# Patient Record
Sex: FEMALE | Race: WHITE | NOT HISPANIC OR LATINO | URBAN - METROPOLITAN AREA
[De-identification: names, ages, dates, MRNs, and addresses within clinical notes are randomized per-mention and may not be internally consistent; named-entity substitution may affect disease eponyms.]

---

## 2018-03-24 ENCOUNTER — INPATIENT (INPATIENT)
Facility: HOSPITAL | Age: 33
LOS: 2 days | Discharge: HOME | End: 2018-03-27
Attending: OBSTETRICS & GYNECOLOGY | Admitting: OBSTETRICS & GYNECOLOGY
Payer: COMMERCIAL

## 2018-03-24 VITALS — HEIGHT: 65 IN | WEIGHT: 154.32 LBS

## 2018-03-24 DIAGNOSIS — Z34.80 ENCOUNTER FOR SUPERVISION OF OTHER NORMAL PREGNANCY, UNSPECIFIED TRIMESTER: ICD-10-CM

## 2018-03-24 DIAGNOSIS — O26.899 OTHER SPECIFIED PREGNANCY RELATED CONDITIONS, UNSPECIFIED TRIMESTER: ICD-10-CM

## 2018-03-24 DIAGNOSIS — Z3A.00 WEEKS OF GESTATION OF PREGNANCY NOT SPECIFIED: ICD-10-CM

## 2018-03-24 LAB
BASOPHILS # BLD AUTO: 0 K/UL — SIGNIFICANT CHANGE UP (ref 0–0.2)
BASOPHILS NFR BLD AUTO: 0.5 % — SIGNIFICANT CHANGE UP (ref 0–2)
BLD GP AB SCN SERPL QL: NEGATIVE — SIGNIFICANT CHANGE UP
EOSINOPHIL # BLD AUTO: 0 K/UL — SIGNIFICANT CHANGE UP (ref 0–0.5)
EOSINOPHIL NFR BLD AUTO: 0.4 % — SIGNIFICANT CHANGE UP (ref 0–6)
HCT VFR BLD CALC: 35.4 % — SIGNIFICANT CHANGE UP (ref 34.5–45)
HGB BLD-MCNC: 12.1 G/DL — SIGNIFICANT CHANGE UP (ref 11.5–15.5)
LYMPHOCYTES # BLD AUTO: 1.8 K/UL — SIGNIFICANT CHANGE UP (ref 1–3.3)
LYMPHOCYTES # BLD AUTO: 19.4 % — SIGNIFICANT CHANGE UP (ref 13–44)
MCHC RBC-ENTMCNC: 27.5 PG — SIGNIFICANT CHANGE UP (ref 27–34)
MCHC RBC-ENTMCNC: 34.3 GM/DL — SIGNIFICANT CHANGE UP (ref 32–36)
MCV RBC AUTO: 80.3 FL — SIGNIFICANT CHANGE UP (ref 80–100)
MONOCYTES # BLD AUTO: 0.7 K/UL — SIGNIFICANT CHANGE UP (ref 0–0.9)
MONOCYTES NFR BLD AUTO: 8 % — SIGNIFICANT CHANGE UP (ref 2–14)
NEUTROPHILS # BLD AUTO: 6.6 K/UL — SIGNIFICANT CHANGE UP (ref 1.8–7.4)
NEUTROPHILS NFR BLD AUTO: 71.7 % — SIGNIFICANT CHANGE UP (ref 43–77)
PLATELET # BLD AUTO: 174 K/UL — SIGNIFICANT CHANGE UP (ref 150–400)
RBC # BLD: 4.41 M/UL — SIGNIFICANT CHANGE UP (ref 3.8–5.2)
RBC # FLD: 16.4 % — HIGH (ref 10.3–14.5)
RH IG SCN BLD-IMP: POSITIVE — SIGNIFICANT CHANGE UP
WBC # BLD: 9.2 K/UL — SIGNIFICANT CHANGE UP (ref 3.8–10.5)
WBC # FLD AUTO: 9.2 K/UL — SIGNIFICANT CHANGE UP (ref 3.8–10.5)

## 2018-03-24 RX ORDER — OXYTOCIN 10 UNIT/ML
333.33 VIAL (ML) INJECTION
Qty: 20 | Refills: 0 | Status: DISCONTINUED | OUTPATIENT
Start: 2018-03-24 | End: 2018-03-25

## 2018-03-24 RX ORDER — SODIUM CHLORIDE 9 MG/ML
1000 INJECTION, SOLUTION INTRAVENOUS ONCE
Qty: 0 | Refills: 0 | Status: COMPLETED | OUTPATIENT
Start: 2018-03-24 | End: 2018-03-25

## 2018-03-24 RX ORDER — AMPICILLIN TRIHYDRATE 250 MG
CAPSULE ORAL
Qty: 0 | Refills: 0 | Status: DISCONTINUED | OUTPATIENT
Start: 2018-03-24 | End: 2018-03-25

## 2018-03-24 RX ORDER — AMPICILLIN TRIHYDRATE 250 MG
2 CAPSULE ORAL ONCE
Qty: 0 | Refills: 0 | Status: COMPLETED | OUTPATIENT
Start: 2018-03-24 | End: 2018-03-24

## 2018-03-24 RX ORDER — AMPICILLIN TRIHYDRATE 250 MG
1 CAPSULE ORAL EVERY 4 HOURS
Qty: 0 | Refills: 0 | Status: DISCONTINUED | OUTPATIENT
Start: 2018-03-25 | End: 2018-03-25

## 2018-03-24 RX ORDER — SODIUM CHLORIDE 9 MG/ML
1000 INJECTION, SOLUTION INTRAVENOUS
Qty: 0 | Refills: 0 | Status: DISCONTINUED | OUTPATIENT
Start: 2018-03-24 | End: 2018-03-25

## 2018-03-24 RX ORDER — CITRIC ACID/SODIUM CITRATE 300-500 MG
15 SOLUTION, ORAL ORAL EVERY 4 HOURS
Qty: 0 | Refills: 0 | Status: DISCONTINUED | OUTPATIENT
Start: 2018-03-24 | End: 2018-03-25

## 2018-03-24 RX ADMIN — Medication 216 GRAM(S): at 21:41

## 2018-03-25 LAB
RH IG SCN BLD-IMP: POSITIVE — SIGNIFICANT CHANGE UP
T PALLIDUM AB TITR SER: NEGATIVE — SIGNIFICANT CHANGE UP

## 2018-03-25 RX ORDER — DOCUSATE SODIUM 100 MG
100 CAPSULE ORAL
Qty: 0 | Refills: 0 | Status: DISCONTINUED | OUTPATIENT
Start: 2018-03-25 | End: 2018-03-26

## 2018-03-25 RX ORDER — HYDROCORTISONE 1 %
1 OINTMENT (GRAM) TOPICAL EVERY 4 HOURS
Qty: 0 | Refills: 0 | Status: DISCONTINUED | OUTPATIENT
Start: 2018-03-25 | End: 2018-03-25

## 2018-03-25 RX ORDER — SODIUM CHLORIDE 9 MG/ML
3 INJECTION INTRAMUSCULAR; INTRAVENOUS; SUBCUTANEOUS EVERY 8 HOURS
Qty: 0 | Refills: 0 | Status: DISCONTINUED | OUTPATIENT
Start: 2018-03-25 | End: 2018-03-27

## 2018-03-25 RX ORDER — OXYCODONE HYDROCHLORIDE 5 MG/1
5 TABLET ORAL EVERY 4 HOURS
Qty: 0 | Refills: 0 | Status: DISCONTINUED | OUTPATIENT
Start: 2018-03-25 | End: 2018-03-27

## 2018-03-25 RX ORDER — IBUPROFEN 200 MG
600 TABLET ORAL EVERY 6 HOURS
Qty: 0 | Refills: 0 | Status: COMPLETED | OUTPATIENT
Start: 2018-03-25 | End: 2019-02-21

## 2018-03-25 RX ORDER — DIPHENHYDRAMINE HCL 50 MG
25 CAPSULE ORAL EVERY 6 HOURS
Qty: 0 | Refills: 0 | Status: DISCONTINUED | OUTPATIENT
Start: 2018-03-25 | End: 2018-03-27

## 2018-03-25 RX ORDER — PRAMOXINE HYDROCHLORIDE 150 MG/15G
1 AEROSOL, FOAM RECTAL EVERY 4 HOURS
Qty: 0 | Refills: 0 | Status: DISCONTINUED | OUTPATIENT
Start: 2018-03-25 | End: 2018-03-27

## 2018-03-25 RX ORDER — AER TRAVELER 0.5 G/1
1 SOLUTION RECTAL; TOPICAL EVERY 4 HOURS
Qty: 0 | Refills: 0 | Status: DISCONTINUED | OUTPATIENT
Start: 2018-03-25 | End: 2018-03-27

## 2018-03-25 RX ORDER — IBUPROFEN 200 MG
600 TABLET ORAL EVERY 6 HOURS
Qty: 0 | Refills: 0 | Status: DISCONTINUED | OUTPATIENT
Start: 2018-03-25 | End: 2018-03-27

## 2018-03-25 RX ORDER — SODIUM CHLORIDE 9 MG/ML
3 INJECTION INTRAMUSCULAR; INTRAVENOUS; SUBCUTANEOUS EVERY 8 HOURS
Qty: 0 | Refills: 0 | Status: DISCONTINUED | OUTPATIENT
Start: 2018-03-25 | End: 2018-03-25

## 2018-03-25 RX ORDER — OXYTOCIN 10 UNIT/ML
41.67 VIAL (ML) INJECTION
Qty: 20 | Refills: 0 | Status: DISCONTINUED | OUTPATIENT
Start: 2018-03-25 | End: 2018-03-27

## 2018-03-25 RX ORDER — OXYTOCIN 10 UNIT/ML
4 VIAL (ML) INJECTION
Qty: 30 | Refills: 0 | Status: DISCONTINUED | OUTPATIENT
Start: 2018-03-25 | End: 2018-03-25

## 2018-03-25 RX ORDER — ACETAMINOPHEN 500 MG
975 TABLET ORAL EVERY 6 HOURS
Qty: 0 | Refills: 0 | Status: COMPLETED | OUTPATIENT
Start: 2018-03-25 | End: 2019-02-21

## 2018-03-25 RX ORDER — DIBUCAINE 1 %
1 OINTMENT (GRAM) RECTAL EVERY 4 HOURS
Qty: 0 | Refills: 0 | Status: DISCONTINUED | OUTPATIENT
Start: 2018-03-25 | End: 2018-03-27

## 2018-03-25 RX ORDER — KETOROLAC TROMETHAMINE 30 MG/ML
30 SYRINGE (ML) INJECTION ONCE
Qty: 0 | Refills: 0 | Status: DISCONTINUED | OUTPATIENT
Start: 2018-03-25 | End: 2018-03-25

## 2018-03-25 RX ORDER — SIMETHICONE 80 MG/1
80 TABLET, CHEWABLE ORAL EVERY 6 HOURS
Qty: 0 | Refills: 0 | Status: DISCONTINUED | OUTPATIENT
Start: 2018-03-25 | End: 2018-03-27

## 2018-03-25 RX ORDER — HYDROCORTISONE 1 %
1 OINTMENT (GRAM) TOPICAL EVERY 4 HOURS
Qty: 0 | Refills: 0 | Status: DISCONTINUED | OUTPATIENT
Start: 2018-03-25 | End: 2018-03-27

## 2018-03-25 RX ORDER — GLYCERIN ADULT
1 SUPPOSITORY, RECTAL RECTAL AT BEDTIME
Qty: 0 | Refills: 0 | Status: DISCONTINUED | OUTPATIENT
Start: 2018-03-25 | End: 2018-03-27

## 2018-03-25 RX ORDER — ACETAMINOPHEN 500 MG
975 TABLET ORAL EVERY 6 HOURS
Qty: 0 | Refills: 0 | Status: DISCONTINUED | OUTPATIENT
Start: 2018-03-25 | End: 2018-03-27

## 2018-03-25 RX ORDER — LANOLIN
1 OINTMENT (GRAM) TOPICAL EVERY 6 HOURS
Qty: 0 | Refills: 0 | Status: DISCONTINUED | OUTPATIENT
Start: 2018-03-25 | End: 2018-03-27

## 2018-03-25 RX ORDER — OXYCODONE HYDROCHLORIDE 5 MG/1
5 TABLET ORAL
Qty: 0 | Refills: 0 | Status: DISCONTINUED | OUTPATIENT
Start: 2018-03-25 | End: 2018-03-27

## 2018-03-25 RX ORDER — DIBUCAINE 1 %
1 OINTMENT (GRAM) RECTAL EVERY 4 HOURS
Qty: 0 | Refills: 0 | Status: DISCONTINUED | OUTPATIENT
Start: 2018-03-25 | End: 2018-03-25

## 2018-03-25 RX ORDER — OXYTOCIN 10 UNIT/ML
41.67 VIAL (ML) INJECTION
Qty: 20 | Refills: 0 | Status: DISCONTINUED | OUTPATIENT
Start: 2018-03-25 | End: 2018-03-25

## 2018-03-25 RX ORDER — AER TRAVELER 0.5 G/1
1 SOLUTION RECTAL; TOPICAL EVERY 4 HOURS
Qty: 0 | Refills: 0 | Status: DISCONTINUED | OUTPATIENT
Start: 2018-03-25 | End: 2018-03-25

## 2018-03-25 RX ORDER — PRAMOXINE HYDROCHLORIDE 150 MG/15G
1 AEROSOL, FOAM RECTAL EVERY 4 HOURS
Qty: 0 | Refills: 0 | Status: DISCONTINUED | OUTPATIENT
Start: 2018-03-25 | End: 2018-03-25

## 2018-03-25 RX ORDER — MAGNESIUM HYDROXIDE 400 MG/1
30 TABLET, CHEWABLE ORAL
Qty: 0 | Refills: 0 | Status: DISCONTINUED | OUTPATIENT
Start: 2018-03-25 | End: 2018-03-27

## 2018-03-25 RX ORDER — TETANUS TOXOID, REDUCED DIPHTHERIA TOXOID AND ACELLULAR PERTUSSIS VACCINE, ADSORBED 5; 2.5; 8; 8; 2.5 [IU]/.5ML; [IU]/.5ML; UG/.5ML; UG/.5ML; UG/.5ML
0.5 SUSPENSION INTRAMUSCULAR ONCE
Qty: 0 | Refills: 0 | Status: DISCONTINUED | OUTPATIENT
Start: 2018-03-25 | End: 2018-03-27

## 2018-03-25 RX ADMIN — Medication 15 MILLILITER(S): at 05:41

## 2018-03-25 RX ADMIN — Medication 100 MILLIGRAM(S): at 21:51

## 2018-03-25 RX ADMIN — SODIUM CHLORIDE 125 MILLILITER(S): 9 INJECTION, SOLUTION INTRAVENOUS at 05:43

## 2018-03-25 RX ADMIN — Medication 600 MILLIGRAM(S): at 19:05

## 2018-03-25 RX ADMIN — Medication 600 MILLIGRAM(S): at 18:25

## 2018-03-25 RX ADMIN — Medication 208 GRAM(S): at 01:40

## 2018-03-25 RX ADMIN — Medication 30 MILLIGRAM(S): at 11:29

## 2018-03-25 RX ADMIN — Medication 4 MILLIUNIT(S)/MIN: at 06:09

## 2018-03-25 RX ADMIN — Medication 975 MILLIGRAM(S): at 21:50

## 2018-03-25 RX ADMIN — Medication 975 MILLIGRAM(S): at 22:20

## 2018-03-25 RX ADMIN — SODIUM CHLORIDE 2000 MILLILITER(S): 9 INJECTION, SOLUTION INTRAVENOUS at 05:42

## 2018-03-25 RX ADMIN — Medication 208 GRAM(S): at 05:39

## 2018-03-26 LAB
HCT VFR BLD CALC: 26.7 % — LOW (ref 34.5–45)
HGB BLD-MCNC: 8.6 G/DL — LOW (ref 11.5–15.5)

## 2018-03-26 RX ORDER — ASCORBIC ACID 60 MG
250 TABLET,CHEWABLE ORAL
Qty: 0 | Refills: 0 | Status: DISCONTINUED | OUTPATIENT
Start: 2018-03-26 | End: 2018-03-27

## 2018-03-26 RX ORDER — LEVOTHYROXINE SODIUM 125 MCG
50 TABLET ORAL DAILY
Qty: 0 | Refills: 0 | Status: DISCONTINUED | OUTPATIENT
Start: 2018-03-26 | End: 2018-03-27

## 2018-03-26 RX ORDER — FERROUS SULFATE 325(65) MG
325 TABLET ORAL
Qty: 0 | Refills: 0 | Status: DISCONTINUED | OUTPATIENT
Start: 2018-03-26 | End: 2018-03-27

## 2018-03-26 RX ORDER — DOCUSATE SODIUM 100 MG
100 CAPSULE ORAL
Qty: 0 | Refills: 0 | Status: DISCONTINUED | OUTPATIENT
Start: 2018-03-26 | End: 2018-03-27

## 2018-03-26 RX ADMIN — Medication 600 MILLIGRAM(S): at 18:34

## 2018-03-26 RX ADMIN — Medication 975 MILLIGRAM(S): at 13:00

## 2018-03-26 RX ADMIN — Medication 975 MILLIGRAM(S): at 18:34

## 2018-03-26 RX ADMIN — Medication 600 MILLIGRAM(S): at 13:00

## 2018-03-26 RX ADMIN — Medication 600 MILLIGRAM(S): at 04:08

## 2018-03-26 RX ADMIN — Medication 975 MILLIGRAM(S): at 12:28

## 2018-03-26 RX ADMIN — Medication 1 TABLET(S): at 12:27

## 2018-03-26 RX ADMIN — Medication 600 MILLIGRAM(S): at 04:38

## 2018-03-26 RX ADMIN — Medication 100 MILLIGRAM(S): at 18:35

## 2018-03-26 RX ADMIN — Medication 600 MILLIGRAM(S): at 19:00

## 2018-03-26 RX ADMIN — Medication 600 MILLIGRAM(S): at 12:28

## 2018-03-26 RX ADMIN — Medication 975 MILLIGRAM(S): at 19:00

## 2018-03-26 RX ADMIN — Medication 50 MICROGRAM(S): at 06:46

## 2018-03-26 RX ADMIN — Medication 250 MILLIGRAM(S): at 18:39

## 2018-03-26 NOTE — PROGRESS NOTE ADULT - SUBJECTIVE AND OBJECTIVE BOX
Patient seen and examined at bedside, no acute overnight events. No acute complaints, pain well controlled. Patient is ambulating, voiding spontaneously, passing gas, and tolerating regular diet.    Vital Signs Last 24 Hours  T(C): 36.5 (03-26-18 @ 06:23), Max: 37.3 (03-25-18 @ 12:23)  HR: 76 (03-26-18 @ 06:23) (73 - 90)  BP: 103/67 (03-26-18 @ 06:23) (100/50 - 134/64)  RR: 18 (03-26-18 @ 06:23) (18 - 22)  SpO2: 99% (03-25-18 @ 12:23) (95% - 99%)    Physical Exam:  General: NAD  Abdomen: Soft, non-tender, non-distended, fundus firm  Pelvic: Lochia wnl    Labs:    Blood Type: A Positive  Antibody Screen: --  RPR: Negative               12.1   9.2   )-----------( 174      ( 03-24 @ 22:11 )             35.4         MEDICATIONS  (STANDING):  acetaminophen   Tablet. 975 milliGRAM(s) Oral every 6 hours  diphtheria/tetanus/pertussis (acellular) Vaccine (ADAcel) 0.5 milliLiter(s) IntraMuscular once  ibuprofen  Tablet 600 milliGRAM(s) Oral every 6 hours  levothyroxine 50 MICROGram(s) Oral daily  oxyCODONE    IR 5 milliGRAM(s) Oral every 3 hours  oxytocin Infusion 41.667 milliUNIT(s)/Min (125 mL/Hr) IV Continuous <Continuous>  prenatal multivitamin 1 Tablet(s) Oral daily  sodium chloride 0.9% lock flush 3 milliLiter(s) IV Push every 8 hours    MEDICATIONS  (PRN):  dibucaine 1% Ointment 1 Application(s) Topical every 4 hours PRN Perineal Discomfort  diphenhydrAMINE   Capsule 25 milliGRAM(s) Oral every 6 hours PRN Itching  docusate sodium 100 milliGRAM(s) Oral two times a day PRN Stool Softening  glycerin Suppository - Adult 1 Suppository(s) Rectal at bedtime PRN Constipation  hydrocortisone 1% Cream 1 Application(s) Topical every 4 hours PRN Moderate to Severe Perineal Pain  lanolin Ointment 1 Application(s) Topical every 6 hours PRN Sore Nipples  magnesium hydroxide Suspension 30 milliLiter(s) Oral two times a day PRN Constipation  oxyCODONE    IR 5 milliGRAM(s) Oral every 4 hours PRN Severe Pain (7 -10)  pramoxine 1%/zinc 5% Cream 1 Application(s) Topical every 4 hours PRN Moderate to Severe Perineal Pain  simethicone 80 milliGRAM(s) Chew every 6 hours PRN Gas  witch hazel Pads 1 Application(s) Topical every 4 hours PRN Perineal Discomfort

## 2018-03-26 NOTE — PROGRESS NOTE ADULT - SUBJECTIVE AND OBJECTIVE BOX
S: Patient doing well. Minimal lochia. Pain controlled.    O: Vital Signs Last 24 Hrs  T(C): 36.5 (26 Mar 2018 06:23), Max: 36.7 (25 Mar 2018 21:13)  T(F): 97.7 (26 Mar 2018 06:23), Max: 98 (25 Mar 2018 21:13)  HR: 76 (26 Mar 2018 06:23) (73 - 78)  BP: 103/67 (26 Mar 2018 06:23) (103/67 - 116/70)  BP(mean): --  RR: 18 (26 Mar 2018 06:23) (18 - 18)  SpO2: --    Gen: NAD  Abd: soft, NT, ND, fundus firm below umbilicus  Lochia: moderate  Ext: no tenderness    Labs:                        8.6    x     )-----------( x        ( 26 Mar 2018 07:26 )             26.7       A: 33y PPD# s/p  doing well.    Assessment and Plan:    Day  1  Vaginal Delivery  She feels well  Continue the current pain medication  Encourage  Ambulation  Encourage regular diet   DVT ppx: SCDs only when not ambulating  She is stable, tolerates a diet and has normal flatus and bowel movements  She will be discharged on Day 2 according to the normal criteria.

## 2018-03-26 NOTE — PROGRESS NOTE ADULT - PROBLEM SELECTOR PLAN 1
- Continue with po analgesia  - Increase ambulation  - Continue regular diet  - IV lock  - H&H today    Edward Barnett MD PGY1

## 2018-03-26 NOTE — CHART NOTE - NSCHARTNOTEFT_GEN_A_CORE
PA Anemia NOTE     Day 1       Vital Signs Last 24 Hrs  T(C): 36.5 (26 Mar 2018 06:23), Max: 37.3 (25 Mar 2018 12:23)  T(F): 97.7 (26 Mar 2018 06:23), Max: 99.1 (25 Mar 2018 12:23)  HR: 76 (26 Mar 2018 06:23) (73 - 90)  BP: 103/67 (26 Mar 2018 06:23) (100/50 - 134/64)  RR: 18 (26 Mar 2018 06:23) (18 - 22)  SpO2: 99% (25 Mar 2018 12:23) (95% - 99%)               8.6    x     )-----------( x        (  @ 07:26 )             26.7                12.1   9.2   )-----------( 174      ( -24 @ 22:11 )             35.4         Assessment:  33y    y.o. S/P  PPD # 1 with anemia, VSS, Asx    Plan:  - Ferrous Sulfate, Colace, Vitamin C supplementation.  - Monitor for signs/symptoms of anemia.     LEODAN Bruce

## 2018-03-27 ENCOUNTER — TRANSCRIPTION ENCOUNTER (OUTPATIENT)
Age: 33
End: 2018-03-27

## 2018-03-27 VITALS
TEMPERATURE: 98 F | SYSTOLIC BLOOD PRESSURE: 96 MMHG | HEART RATE: 64 BPM | DIASTOLIC BLOOD PRESSURE: 63 MMHG | RESPIRATION RATE: 18 BRPM

## 2018-03-27 PROCEDURE — 59050 FETAL MONITOR W/REPORT: CPT

## 2018-03-27 PROCEDURE — 59025 FETAL NON-STRESS TEST: CPT

## 2018-03-27 PROCEDURE — 86780 TREPONEMA PALLIDUM: CPT

## 2018-03-27 PROCEDURE — 86901 BLOOD TYPING SEROLOGIC RH(D): CPT

## 2018-03-27 PROCEDURE — 86850 RBC ANTIBODY SCREEN: CPT

## 2018-03-27 PROCEDURE — 85027 COMPLETE CBC AUTOMATED: CPT

## 2018-03-27 PROCEDURE — G0463: CPT

## 2018-03-27 PROCEDURE — 85018 HEMOGLOBIN: CPT

## 2018-03-27 PROCEDURE — 86900 BLOOD TYPING SEROLOGIC ABO: CPT

## 2018-03-27 RX ORDER — ACETAMINOPHEN 500 MG
3 TABLET ORAL
Qty: 0 | Refills: 0 | COMMUNITY
Start: 2018-03-27

## 2018-03-27 RX ORDER — IBUPROFEN 200 MG
1 TABLET ORAL
Qty: 0 | Refills: 0 | COMMUNITY
Start: 2018-03-27

## 2018-03-27 RX ADMIN — Medication 600 MILLIGRAM(S): at 08:33

## 2018-03-27 RX ADMIN — Medication 600 MILLIGRAM(S): at 00:04

## 2018-03-27 RX ADMIN — Medication 1 TABLET(S): at 12:29

## 2018-03-27 RX ADMIN — Medication 600 MILLIGRAM(S): at 00:56

## 2018-03-27 RX ADMIN — Medication 975 MILLIGRAM(S): at 09:00

## 2018-03-27 RX ADMIN — Medication 975 MILLIGRAM(S): at 08:33

## 2018-03-27 RX ADMIN — Medication 50 MICROGRAM(S): at 05:26

## 2018-03-27 RX ADMIN — Medication 325 MILLIGRAM(S): at 05:26

## 2018-03-27 RX ADMIN — Medication 250 MILLIGRAM(S): at 05:26

## 2018-03-27 RX ADMIN — Medication 600 MILLIGRAM(S): at 09:00

## 2018-03-27 RX ADMIN — Medication 100 MILLIGRAM(S): at 05:26

## 2018-03-27 NOTE — DISCHARGE NOTE OB - CARE PLAN
Principal Discharge DX:	Term birth of   Goal:	Routine pp  Assessment and plan of treatment:	Office appt in 6 weeks. Regular diet and activity

## 2018-03-27 NOTE — DISCHARGE NOTE OB - PATIENT PORTAL LINK FT
You can access the [facility] Patient Portal, offered by [facility], by registering with the following website: [email or web address]

## 2018-03-27 NOTE — DISCHARGE NOTE OB - CARE PROVIDER_API CALL
Nicolás Barrios (MD), Obstetrics and Gynecology  [address] [place] [address]  Phone: [phone]  Fax: [phone]

## 2018-09-07 NOTE — DISCHARGE NOTE OB - USE THE FOOD PYRAMID (LOCATED IN DISCHARGE MATERIALS PROVIDED) AS A GUIDE TO BALANCE AND MODERATION
[facility]    Progress Note    Christina Velasquez Patient Status:  Inpatient    1960 MRN [id number]   [facility] 6NE-A Attending Adeel Albert MD   Hosp Day # 7 PCP [facility]     Subjective:  Christina Velasquez is a(n) 60year old ma thrombosis (DVT) of femoral vein of right lower extremity (HCC)     Coagulopathy (HCC)     Acute respiratory failure with hypoxia and hypercapnia (HCC)    DVT and PE: Patient has LUE and RLE DVT, as well as bilateral PE.  Likely secondary to hospitalization Statement Selected

## 2018-10-04 PROBLEM — Z00.00 ENCOUNTER FOR PREVENTIVE HEALTH EXAMINATION: Status: ACTIVE | Noted: 2018-10-04

## 2018-10-23 ENCOUNTER — APPOINTMENT (OUTPATIENT)
Dept: PLASTIC SURGERY | Facility: CLINIC | Age: 33
End: 2018-10-23
Payer: COMMERCIAL

## 2018-10-23 VITALS — WEIGHT: 120 LBS | BODY MASS INDEX: 19.99 KG/M2 | HEIGHT: 65 IN

## 2018-10-23 DIAGNOSIS — Z80.8 FAMILY HISTORY OF MALIGNANT NEOPLASM OF OTHER ORGANS OR SYSTEMS: ICD-10-CM

## 2018-10-23 DIAGNOSIS — Z78.9 OTHER SPECIFIED HEALTH STATUS: ICD-10-CM

## 2018-10-23 DIAGNOSIS — Q82.5 CONGENITAL NON-NEOPLASTIC NEVUS: ICD-10-CM

## 2018-10-23 PROCEDURE — 99203 OFFICE O/P NEW LOW 30 MIN: CPT

## 2021-03-16 ENCOUNTER — APPOINTMENT (OUTPATIENT)
Dept: PSYCHIATRY | Facility: CLINIC | Age: 36
End: 2021-03-16
Payer: COMMERCIAL

## 2021-03-16 DIAGNOSIS — J45.909 UNSPECIFIED ASTHMA, UNCOMPLICATED: ICD-10-CM

## 2021-03-16 DIAGNOSIS — E78.5 HYPERLIPIDEMIA, UNSPECIFIED: ICD-10-CM

## 2021-03-16 PROCEDURE — 99205 OFFICE O/P NEW HI 60 MIN: CPT

## 2021-03-16 PROCEDURE — 99072 ADDL SUPL MATRL&STAF TM PHE: CPT

## 2021-04-06 ENCOUNTER — APPOINTMENT (OUTPATIENT)
Dept: PSYCHIATRY | Facility: CLINIC | Age: 36
End: 2021-04-06
Payer: COMMERCIAL

## 2021-04-06 PROCEDURE — 99072 ADDL SUPL MATRL&STAF TM PHE: CPT

## 2021-04-06 PROCEDURE — 99214 OFFICE O/P EST MOD 30 MIN: CPT

## 2021-04-28 ENCOUNTER — APPOINTMENT (OUTPATIENT)
Dept: PSYCHIATRY | Facility: CLINIC | Age: 36
End: 2021-04-28
Payer: COMMERCIAL

## 2021-04-28 PROCEDURE — 99214 OFFICE O/P EST MOD 30 MIN: CPT

## 2021-04-28 PROCEDURE — 99072 ADDL SUPL MATRL&STAF TM PHE: CPT

## 2021-04-28 RX ORDER — FLUOXETINE HYDROCHLORIDE 20 MG/1
20 TABLET ORAL
Qty: 45 | Refills: 0 | Status: COMPLETED | COMMUNITY
Start: 2021-03-16 | End: 2021-04-28

## 2021-05-04 ENCOUNTER — APPOINTMENT (OUTPATIENT)
Dept: PSYCHIATRY | Facility: CLINIC | Age: 36
End: 2021-05-04

## 2021-05-19 ENCOUNTER — APPOINTMENT (OUTPATIENT)
Dept: PSYCHIATRY | Facility: CLINIC | Age: 36
End: 2021-05-19
Payer: COMMERCIAL

## 2021-05-19 PROCEDURE — 99214 OFFICE O/P EST MOD 30 MIN: CPT

## 2021-05-19 PROCEDURE — 99072 ADDL SUPL MATRL&STAF TM PHE: CPT

## 2021-06-30 ENCOUNTER — APPOINTMENT (OUTPATIENT)
Dept: PSYCHIATRY | Facility: CLINIC | Age: 36
End: 2021-06-30
Payer: COMMERCIAL

## 2021-06-30 PROCEDURE — 99072 ADDL SUPL MATRL&STAF TM PHE: CPT

## 2021-06-30 PROCEDURE — 99214 OFFICE O/P EST MOD 30 MIN: CPT

## 2021-08-25 ENCOUNTER — APPOINTMENT (OUTPATIENT)
Dept: PSYCHIATRY | Facility: CLINIC | Age: 36
End: 2021-08-25
Payer: COMMERCIAL

## 2021-08-25 PROCEDURE — 99214 OFFICE O/P EST MOD 30 MIN: CPT

## 2021-08-25 RX ORDER — VENLAFAXINE HYDROCHLORIDE 37.5 MG/1
37.5 CAPSULE, EXTENDED RELEASE ORAL
Qty: 30 | Refills: 1 | Status: DISCONTINUED | COMMUNITY
Start: 2021-05-19 | End: 2021-08-25

## 2021-11-09 ENCOUNTER — APPOINTMENT (OUTPATIENT)
Dept: PSYCHIATRY | Facility: CLINIC | Age: 36
End: 2021-11-09
Payer: COMMERCIAL

## 2021-11-09 PROCEDURE — 99214 OFFICE O/P EST MOD 30 MIN: CPT

## 2022-01-25 ENCOUNTER — APPOINTMENT (OUTPATIENT)
Dept: PSYCHIATRY | Facility: CLINIC | Age: 37
End: 2022-01-25
Payer: COMMERCIAL

## 2022-01-25 PROCEDURE — 99214 OFFICE O/P EST MOD 30 MIN: CPT

## 2022-03-01 ENCOUNTER — APPOINTMENT (OUTPATIENT)
Dept: PSYCHIATRY | Facility: CLINIC | Age: 37
End: 2022-03-01
Payer: COMMERCIAL

## 2022-03-01 PROCEDURE — 99214 OFFICE O/P EST MOD 30 MIN: CPT

## 2022-03-01 RX ORDER — VENLAFAXINE HYDROCHLORIDE 150 MG/1
150 CAPSULE, EXTENDED RELEASE ORAL
Qty: 90 | Refills: 0 | Status: DISCONTINUED | COMMUNITY
Start: 2021-04-28 | End: 2022-03-01

## 2022-03-22 ENCOUNTER — APPOINTMENT (OUTPATIENT)
Dept: PSYCHIATRY | Facility: CLINIC | Age: 37
End: 2022-03-22
Payer: COMMERCIAL

## 2022-03-22 PROCEDURE — 99214 OFFICE O/P EST MOD 30 MIN: CPT

## 2022-03-22 RX ORDER — VENLAFAXINE HYDROCHLORIDE 37.5 MG/1
37.5 CAPSULE, EXTENDED RELEASE ORAL DAILY
Qty: 90 | Refills: 0 | Status: DISCONTINUED | COMMUNITY
Start: 2021-11-09 | End: 2022-03-22

## 2022-04-12 ENCOUNTER — APPOINTMENT (OUTPATIENT)
Dept: PSYCHIATRY | Facility: CLINIC | Age: 37
End: 2022-04-12
Payer: COMMERCIAL

## 2022-04-12 PROCEDURE — 99214 OFFICE O/P EST MOD 30 MIN: CPT

## 2022-05-10 ENCOUNTER — APPOINTMENT (OUTPATIENT)
Dept: PSYCHIATRY | Facility: CLINIC | Age: 37
End: 2022-05-10
Payer: COMMERCIAL

## 2022-05-10 PROCEDURE — 99214 OFFICE O/P EST MOD 30 MIN: CPT

## 2022-05-10 RX ORDER — VENLAFAXINE HYDROCHLORIDE 37.5 MG/1
37.5 CAPSULE, EXTENDED RELEASE ORAL AS DIRECTED
Qty: 30 | Refills: 0 | Status: DISCONTINUED | COMMUNITY
Start: 2022-04-12 | End: 2022-05-10

## 2022-06-07 ENCOUNTER — APPOINTMENT (OUTPATIENT)
Dept: PSYCHIATRY | Facility: CLINIC | Age: 37
End: 2022-06-07

## 2022-07-12 ENCOUNTER — APPOINTMENT (OUTPATIENT)
Dept: PSYCHIATRY | Facility: CLINIC | Age: 37
End: 2022-07-12

## 2022-07-12 PROCEDURE — 99214 OFFICE O/P EST MOD 30 MIN: CPT | Mod: 95

## 2022-07-12 RX ORDER — FLUTICASONE PROPIONATE 50 UG/1
50 SPRAY, METERED NASAL
Qty: 16 | Refills: 0 | Status: DISCONTINUED | COMMUNITY
Start: 2022-07-07

## 2022-07-12 RX ORDER — AMOXICILLIN AND CLAVULANATE POTASSIUM 875; 125 MG/1; MG/1
875-125 TABLET, COATED ORAL
Qty: 20 | Refills: 0 | Status: DISCONTINUED | COMMUNITY
Start: 2022-07-07

## 2022-07-12 RX ORDER — BENZONATATE 200 MG/1
200 CAPSULE ORAL
Qty: 20 | Refills: 0 | Status: DISCONTINUED | COMMUNITY
Start: 2022-07-07

## 2022-07-12 RX ORDER — AMOXICILLIN AND CLAVULANATE POTASSIUM 500; 125 MG/1; MG/1
500-125 TABLET, FILM COATED ORAL
Qty: 14 | Refills: 0 | Status: DISCONTINUED | COMMUNITY
Start: 2022-05-12

## 2022-08-30 ENCOUNTER — APPOINTMENT (OUTPATIENT)
Dept: PSYCHIATRY | Facility: CLINIC | Age: 37
End: 2022-08-30

## 2022-08-30 PROCEDURE — 99214 OFFICE O/P EST MOD 30 MIN: CPT | Mod: 95

## 2022-11-29 ENCOUNTER — APPOINTMENT (OUTPATIENT)
Dept: PSYCHIATRY | Facility: CLINIC | Age: 37
End: 2022-11-29

## 2022-11-29 PROCEDURE — 99214 OFFICE O/P EST MOD 30 MIN: CPT | Mod: 95

## 2022-11-29 RX ORDER — ALBUTEROL SULFATE 90 UG/1
108 (90 BASE) INHALANT RESPIRATORY (INHALATION)
Qty: 7 | Refills: 0 | Status: DISCONTINUED | COMMUNITY
Start: 2022-11-07

## 2022-11-29 NOTE — DISCUSSION/SUMMARY
[FreeTextEntry1] : The patient is a 35 yo woman with hx of sx consistent with major depressive disorder, and anxiety sx, on sertraline with good effect, came off meds during 3rd trimester, had   had postpartum depression, resumed sertraline, but only partially effective and also reports premenstrual exacerbation of mood sx. \par \par 4/6/2021: Patient reports mild discontinuation withdrawal sx with Zoloft taper and needed longer taper and also had mild GI sx during cross titration while on both Zoloft and Prozac, now no GI side effects on Prozac alone, and minimal change in sx, still feeling depressed. \par 4/28/2021: Patient reports continued GI side effects with mild nausea on Prozac and reports also feeling more irritable and angry and continues to feel depressed.  Patient also had only a partial benefit with Zoloft and could not tolerate 200 mg when dose was maximized for increased therapeutic benefit.  Considering patient had 2 SSRI trials we will switch her to an SNRI.\par 5/19/2021: Patient reports slight improvement of mood and reduced irritability, rates her symptoms of depression at 5 out of 10,10 being the worst, tolerating Effexor well, will continue with dose titration for increased therapeutic benefit.\par 6/30/2021: Patient reports significant improvement of symptoms of depression and premenstrual dysphoric symptoms and reports that she feels like she is back to her baseline, no side effects with medications at this time.\par 08/25/2021: Patient reports premenstrual exacerbation of mood symptoms which are lasting longer than a day after she begins her period, and may benefit from increasing Effexor XR dose for better control of sx in per and post menstrual period. \par 11/9/2021: Patient reports improved symptoms of depression after last dose increase with the Effexor but still continues to have some premenstrual dysphoric symptoms, would benefit from increasing the Effexor dose to maximize the therapeutic benefit.\par 1/25/2022:States she is feeling better, but still 2 days before her period she is feeling very irritable and emotional and a day after she gets her period she is back to feeling better and states rest of the month mood is significantly better and rates depression symptoms at intensity of 2/10, 10 being the worst. \par 3/1/2022: Patient states she felt Effexor helped for a few months, but for past 6 weeks or so she is not feeling good and that her sx of depression were gradually getting worse and she hoped coping skills and med changes made on last visit would help, but she is having adverse effects and depression sx are getting worse. \par 3/21/2022: Patient is of Effexor, and on bupropion for less than 2 weeks, and no side effects, reports minimal improvement of mood and anxiety sx, We agree to monitor sx for incremental improvement on current dose of bupropion. \par 4/12/2022: Patient reports improving symptoms of depression and anxiety after she started bupropion however she still continues to experience discontinuation withdrawal symptoms when she goes without venlafaxine for 48 hours. \par 5/10/2022: Patient reports she feels depression is much better on bupropion and also she is not having the side effects as she did with venlafaxine and still having some withdrawal symptoms and tapering off venlafaxine slowly.  She reports premenstrual dysphoric mood symptoms with the last menstrual cycle and wants to take SSRI as we had previously discussed.  Patient was on Zoloft had no side effects but it was not effective in treating depression symptoms anymore.  Discussed with the patient either a trial of sertraline or Lexapro for premenstrual mood symptoms.  Even though sertraline was not effective and treatment of MDD mechanism of action for PMDD is different and considering she had no side effects we could again try sertraline for PMDD, if not effective we will consider use another SSRI.  Considering she did not have any side effects with Zoloft she would like to try again Zoloft for premenstrual mood symptoms.\par 7/12/2022: Patient reports that she feels depression symptoms are in good control in the premenstrual anxiety and mood symptoms are better controlled with adding some trim in the motel fees but it appears that she continues to feel the symptoms until about 3 days into her menstrual cycle. \par 08/30/2022:Patient was advised to take sertraline for irritability she had experienced before menstrual period and she also was having this irritability continue for a few days after she got her period, and she had been taking sertraline daily with good effect on mood and remains stable with current meds, reported no depression and anxiety sx at this time. \par 11/29/2022: Patient reported sustained improvement of symptoms of depression, premenstrual mood symptoms and anxiety symptoms continue to remain in good control with the current medication regimen.\par \par

## 2022-11-29 NOTE — PLAN
[No] : No [Medication education provided] : Medication education provided. [Rationale for medication choices, possible risks/precautions, benefits, alternative treatment choices, and consequences of non-treatment discussed] : Rationale for medication choices, possible risks/precautions, benefits, alternative treatment choices, and consequences of non-treatment discussed with patient/family/caregiver  [FreeTextEntry5] : Psychoeducation and supportive therapy provided,  and discussed rationale for recommended meds. \par Discussed use of light box for light therapy and behavior activation for seasonal depression\par Medication: Continue bupropion  mg/day and continue Sertraline 50 mg/day \par Educated patient of importance of remaining abstinent from drugs and alcohol. \par Emergency procedures were discussed: pt. educated to call 911 or go to nearest ER for worsening of symptoms/suicidal/homicidal ideation. \par RTC in 3 months or earlier as needed \par Patient given opportunity to ask questions and their questions were answered and they expressed understanding and agreement with above plan.\par

## 2022-11-29 NOTE — HISTORY OF PRESENT ILLNESS
[Medical Office: ([facility])___] : at the medical office located in  [Verbal consent obtained from patient] : the patient, [unfilled] [Home] : at home, [unfilled] , at the time of the visit. [FreeTextEntry1] : Patient reported she continuing to feel better consistently. \par Patient states she is glad that that the combination of medication is helping.  She reported that she has not felt depressed and/or anxious and before her.  She notices a slight irritability but she is able to be mindful and "not act on it".  Patient reported that before Thanksgiving holiday she was baking a lot and that she actually was able to stay focused and organized and got things done and felt very happy and enjoyed the process.  Patient reported that they celebrate both Hanukkah and Thanksgiving and their family and she is looking forward to the holidays even though she knows that they could be a little bit stressful she feels that "I can handle it at this time".  Patient reported that she does have some symptoms of depression during winter months and states she is going to start using a light box. \par She reported she has energy, motivation and desire to do things and denied feeling hopeless or helpless and denied passive or active SI/plan or intent. \par No new medical issues, no new medication since last visit\par She reported adherence to med regimen\par \par \par

## 2023-03-01 ENCOUNTER — APPOINTMENT (OUTPATIENT)
Dept: PSYCHIATRY | Facility: CLINIC | Age: 38
End: 2023-03-01
Payer: COMMERCIAL

## 2023-03-01 PROCEDURE — 99214 OFFICE O/P EST MOD 30 MIN: CPT | Mod: 95

## 2023-03-01 NOTE — REASON FOR VISIT
[Patient] : Patient [Patient preference] : as per patient preference [Telehealth (audio & video) - Individual/Group] : This visit was provided via telehealth using real-time 2-way audio visual technology. [Medical Office: ([facility])___] : The provider was located at the medical office in [unfilled]. [Home] : The patient, [unfilled], was located at home, [unfilled], at the time of the visit. [Verbal consent obtained from patient/other participant(s)] : Verbal consent for telehealth/telephonic services obtained from patient/other participant(s) [FreeTextEntry1] : depression and anxiety

## 2023-03-01 NOTE — PLAN
[No] : No [Medication education provided] : Medication education provided. [Rationale for medication choices, possible risks/precautions, benefits, alternative treatment choices, and consequences of non-treatment discussed] : Rationale for medication choices, possible risks/precautions, benefits, alternative treatment choices, and consequences of non-treatment discussed with patient/family/caregiver  [FreeTextEntry5] : Psychoeducation and supportive therapy provided,  and discussed rationale for recommended meds. \par Discussed use of light box for light therapy and behavior activation for seasonal depression\par Medication: Continue bupropion  mg/day and continue Sertraline 50 mg/day \par Educated patient of importance of remaining abstinent from drugs and alcohol. \par Emergency procedures were discussed: pt. educated to call 911 or go to nearest ER for worsening of symptoms/suicidal/homicidal ideation. \par Patient and  see a couples therapist and patient states that she has been thinking about seeing an individual therapist but logistics of financing and schedule not very favorable at this time \par RTC in 3 months or earlier as needed \par Patient given opportunity to ask questions and their questions were answered and they expressed understanding and agreement with above plan.\par

## 2023-03-01 NOTE — HISTORY OF PRESENT ILLNESS
[Home] : at home, [unfilled] , at the time of the visit. [Medical Office: ([facility])___] : at the medical office located in  [Verbal consent obtained from patient] : the patient, [unfilled] [FreeTextEntry1] : Patient reported she continuing to do well since last visit.  Patient states that she started using the light box for light therapy and feels this winter seasonal depression symptoms have been much better controlled and also as the winter is so much lighter this year she has been able to go out more and that also helps with the mood.  Patient reported sleep and appetite are good.  Patient states she also feels that the current combination is helping with depression symptoms and also premenstrual dysphoric symptoms.\par She reported she has energy, motivation and desire to do things and denied feeling hopeless or helpless and denied passive or active SI/plan or intent. \par No new medical issues, no new medication since last visit\par She reported adherence to med regimen\par Patient states her father's health is not so good and that she feels a little stressed but able to cope\par \par

## 2023-03-01 NOTE — PHYSICAL EXAM
[None] : none [Average] : average [Cooperative] : cooperative [Euthymic] : euthymic [Full] : full [Clear] : clear [Linear/Goal Directed] : linear/goal directed [WNL] : within normal limits [None Reported] : none reported [FreeTextEntry7] : No SI/HI

## 2023-03-01 NOTE — DISCUSSION/SUMMARY
[FreeTextEntry1] : The patient is a 35 yo woman with hx of sx consistent with major depressive disorder, and anxiety sx, on sertraline with good effect, came off meds during 3rd trimester, had   had postpartum depression, resumed sertraline, but only partially effective and also reports premenstrual exacerbation of mood sx. \par \par 4/6/2021: Patient reports mild discontinuation withdrawal sx with Zoloft taper and needed longer taper and also had mild GI sx during cross titration while on both Zoloft and Prozac, now no GI side effects on Prozac alone, and minimal change in sx, still feeling depressed. \par 4/28/2021: Patient reports continued GI side effects with mild nausea on Prozac and reports also feeling more irritable and angry and continues to feel depressed.  Patient also had only a partial benefit with Zoloft and could not tolerate 200 mg when dose was maximized for increased therapeutic benefit.  Considering patient had 2 SSRI trials we will switch her to an SNRI.\par 5/19/2021: Patient reports slight improvement of mood and reduced irritability, rates her symptoms of depression at 5 out of 10,10 being the worst, tolerating Effexor well, will continue with dose titration for increased therapeutic benefit.\par 6/30/2021: Patient reports significant improvement of symptoms of depression and premenstrual dysphoric symptoms and reports that she feels like she is back to her baseline, no side effects with medications at this time.\par 08/25/2021: Patient reports premenstrual exacerbation of mood symptoms which are lasting longer than a day after she begins her period, and may benefit from increasing Effexor XR dose for better control of sx in per and post menstrual period. \par 11/9/2021: Patient reports improved symptoms of depression after last dose increase with the Effexor but still continues to have some premenstrual dysphoric symptoms, would benefit from increasing the Effexor dose to maximize the therapeutic benefit.\par 1/25/2022:States she is feeling better, but still 2 days before her period she is feeling very irritable and emotional and a day after she gets her period she is back to feeling better and states rest of the month mood is significantly better and rates depression symptoms at intensity of 2/10, 10 being the worst. \par 3/1/2022: Patient states she felt Effexor helped for a few months, but for past 6 weeks or so she is not feeling good and that her sx of depression were gradually getting worse and she hoped coping skills and med changes made on last visit would help, but she is having adverse effects and depression sx are getting worse. \par 3/21/2022: Patient is of Effexor, and on bupropion for less than 2 weeks, and no side effects, reports minimal improvement of mood and anxiety sx, We agree to monitor sx for incremental improvement on current dose of bupropion. \par 4/12/2022: Patient reports improving symptoms of depression and anxiety after she started bupropion however she still continues to experience discontinuation withdrawal symptoms when she goes without venlafaxine for 48 hours. \par 5/10/2022: Patient reports she feels depression is much better on bupropion and also she is not having the side effects as she did with venlafaxine and still having some withdrawal symptoms and tapering off venlafaxine slowly.  She reports premenstrual dysphoric mood symptoms with the last menstrual cycle and wants to take SSRI as we had previously discussed.  Patient was on Zoloft had no side effects but it was not effective in treating depression symptoms anymore.  Discussed with the patient either a trial of sertraline or Lexapro for premenstrual mood symptoms.  Even though sertraline was not effective and treatment of MDD mechanism of action for PMDD is different and considering she had no side effects we could again try sertraline for PMDD, if not effective we will consider use another SSRI.  Considering she did not have any side effects with Zoloft she would like to try again Zoloft for premenstrual mood symptoms.\par 7/12/2022: Patient reports that she feels depression symptoms are in good control in the premenstrual anxiety and mood symptoms are better controlled with adding some trim in the motel fees but it appears that she continues to feel the symptoms until about 3 days into her menstrual cycle. \par 08/30/2022:Patient was advised to take sertraline for irritability she had experienced before menstrual period and she also was having this irritability continue for a few days after she got her period, and she had been taking sertraline daily with good effect on mood and remains stable with current meds, reported no depression and anxiety sx at this time. \par 11/29/2022: Patient reported sustained improvement of symptoms of depression, premenstrual mood symptoms and anxiety symptoms continue to remain in good control with the current medication regimen.\par 3/1/2023: Patient is continuing to do well on current medication regimen and also just reported that the light therapy has helped with the seasonal depression symptoms and feels that the current meds also help with the PMDD symptoms.\par

## 2023-06-13 ENCOUNTER — APPOINTMENT (OUTPATIENT)
Dept: PSYCHIATRY | Facility: CLINIC | Age: 38
End: 2023-06-13
Payer: COMMERCIAL

## 2023-06-13 PROCEDURE — 99214 OFFICE O/P EST MOD 30 MIN: CPT | Mod: 95

## 2023-06-13 NOTE — REASON FOR VISIT
[Patient preference] : as per patient preference [Telehealth (audio & video) - Individual/Group] : This visit was provided via telehealth using real-time 2-way audio visual technology. [Medical Office: ([facility])___] : The provider was located at the medical office in [unfilled]. [Home] : The patient, [unfilled], was located at home, [unfilled], at the time of the visit. [Verbal consent obtained from patient/other participant(s)] : Verbal consent for telehealth/telephonic services obtained from patient/other participant(s) [Patient] : Patient [FreeTextEntry1] : depression and anxiety

## 2023-06-13 NOTE — PLAN
[No] : No [Medication education provided] : Medication education provided. [Rationale for medication choices, possible risks/precautions, benefits, alternative treatment choices, and consequences of non-treatment discussed] : Rationale for medication choices, possible risks/precautions, benefits, alternative treatment choices, and consequences of non-treatment discussed with patient/family/caregiver  [FreeTextEntry5] : Psychoeducation and supportive therapy provided,  and discussed rationale for recommended meds. \par Medication: Continue bupropion  mg/day and continue Sertraline 50 mg/day \par Educated patient of importance of remaining abstinent from drugs and alcohol. \par Emergency procedures were discussed: pt. educated to call 911 or go to nearest ER for worsening of symptoms/suicidal/homicidal ideation. \par Patient and  see a couples therapist and patient states that she has been thinking about seeing an individual therapist but logistics of financing and schedule not very favorable at this time \par RTC in 3 months or earlier as needed \par Patient given opportunity to ask questions and their questions were answered and they expressed understanding and agreement with above plan.\par

## 2023-06-13 NOTE — DISCUSSION/SUMMARY
[FreeTextEntry1] : The patient is a 35 yo woman with hx of sx consistent with major depressive disorder, and anxiety sx, on sertraline with good effect, came off meds during 3rd trimester, had   had postpartum depression, resumed sertraline, but only partially effective and also reports premenstrual exacerbation of mood sx. \par \par 4/6/2021: Patient reports mild discontinuation withdrawal sx with Zoloft taper and needed longer taper and also had mild GI sx during cross titration while on both Zoloft and Prozac, now no GI side effects on Prozac alone, and minimal change in sx, still feeling depressed. \par 4/28/2021: Patient reports continued GI side effects with mild nausea on Prozac and reports also feeling more irritable and angry and continues to feel depressed.  Patient also had only a partial benefit with Zoloft and could not tolerate 200 mg when dose was maximized for increased therapeutic benefit.  Considering patient had 2 SSRI trials we will switch her to an SNRI.\par 5/19/2021: Patient reports slight improvement of mood and reduced irritability, rates her symptoms of depression at 5 out of 10,10 being the worst, tolerating Effexor well, will continue with dose titration for increased therapeutic benefit.\par 6/30/2021: Patient reports significant improvement of symptoms of depression and premenstrual dysphoric symptoms and reports that she feels like she is back to her baseline, no side effects with medications at this time.\par 08/25/2021: Patient reports premenstrual exacerbation of mood symptoms which are lasting longer than a day after she begins her period, and may benefit from increasing Effexor XR dose for better control of sx in per and post menstrual period. \par 11/9/2021: Patient reports improved symptoms of depression after last dose increase with the Effexor but still continues to have some premenstrual dysphoric symptoms, would benefit from increasing the Effexor dose to maximize the therapeutic benefit.\par 1/25/2022:States she is feeling better, but still 2 days before her period she is feeling very irritable and emotional and a day after she gets her period she is back to feeling better and states rest of the month mood is significantly better and rates depression symptoms at intensity of 2/10, 10 being the worst. \par 3/1/2022: Patient states she felt Effexor helped for a few months, but for past 6 weeks or so she is not feeling good and that her sx of depression were gradually getting worse and she hoped coping skills and med changes made on last visit would help, but she is having adverse effects and depression sx are getting worse. \par 3/21/2022: Patient is of Effexor, and on bupropion for less than 2 weeks, and no side effects, reports minimal improvement of mood and anxiety sx, We agree to monitor sx for incremental improvement on current dose of bupropion. \par 4/12/2022: Patient reports improving symptoms of depression and anxiety after she started bupropion however she still continues to experience discontinuation withdrawal symptoms when she goes without venlafaxine for 48 hours. \par 5/10/2022: Patient reports she feels depression is much better on bupropion and also she is not having the side effects as she did with venlafaxine and still having some withdrawal symptoms and tapering off venlafaxine slowly.  She reports premenstrual dysphoric mood symptoms with the last menstrual cycle and wants to take SSRI as we had previously discussed.  Patient was on Zoloft had no side effects but it was not effective in treating depression symptoms anymore.  Discussed with the patient either a trial of sertraline or Lexapro for premenstrual mood symptoms.  Even though sertraline was not effective and treatment of MDD mechanism of action for PMDD is different and considering she had no side effects we could again try sertraline for PMDD, if not effective we will consider use another SSRI.  Considering she did not have any side effects with Zoloft she would like to try again Zoloft for premenstrual mood symptoms.\par 7/12/2022: Patient reports that she feels depression symptoms are in good control in the premenstrual anxiety and mood symptoms are better controlled with adding some trim in the motel fees but it appears that she continues to feel the symptoms until about 3 days into her menstrual cycle. \par 08/30/2022:Patient was advised to take sertraline for irritability she had experienced before menstrual period and she also was having this irritability continue for a few days after she got her period, and she had been taking sertraline daily with good effect on mood and remains stable with current meds, reported no depression and anxiety sx at this time. \par 11/29/2022: Patient reported sustained improvement of symptoms of depression, premenstrual mood symptoms and anxiety symptoms continue to remain in good control with the current medication regimen.\par 3/1/2023: Patient is continuing to do well on current medication regimen and also just reported that the light therapy has helped with the seasonal depression symptoms and feels that the current meds also help with the PMDD symptoms.\par 6/12/2023: Patient overall continuing to do well with good control of symptoms of depression and PMDD with current medication regimen.  Reported some stress related to father's health and felt that situational anxiety symptom intensity was slightly increased but overall feels she is coping with anxiety symptoms and agrees to continue to monitor for stability and if there is an increase in overall anxiety then we can consider med changes otherwise she will learn coping skills and breathing and relaxation techniques to help her manage the anxiety symptoms that she usually experiences in certain situations.

## 2023-06-13 NOTE — HISTORY OF PRESENT ILLNESS
[FreeTextEntry1] : Patient reported she doing okay but feels that in certain situations when she previously used to get anxious she still does but the intensity of the anxiety and the situation seems to have increased.  She reported for example last week she had to go for a dental appointment which was a regular appointment and she generally does not like going to the dentist and she states a couple months ago she would not have felt as anxious as she did last week.  Patient states that this anxiety is still situational and not interfering with the daily functioning.  Patient reported that for the past several months her dad has been having health issues and he is now okay but the situation has been good news and bad news time to time and that she reports has been a slight increase in daily stress.  Patient states that work and home life as far as the children and  is good and they are continuing with couples therapy.  Patient states she did not start individual therapy but once in a while she may go see the couples therapist for an individual session.  \par Patient reported sleep and appetite are good.  Patient states she also feels that the current combination is helping with depression symptoms and also premenstrual dysphoric symptoms.She reported she continues to have energy, motivation and desire to do things and denied feeling hopeless or helpless and denied passive or active SI/plan or intent. \par No new medical issues, no new medication since last visit\par She reported adherence to med regimen\par \par

## 2023-10-31 ENCOUNTER — APPOINTMENT (OUTPATIENT)
Dept: PSYCHIATRY | Facility: CLINIC | Age: 38
End: 2023-10-31
Payer: COMMERCIAL

## 2023-10-31 PROCEDURE — 99214 OFFICE O/P EST MOD 30 MIN: CPT

## 2024-01-23 ENCOUNTER — APPOINTMENT (OUTPATIENT)
Dept: PSYCHIATRY | Facility: CLINIC | Age: 39
End: 2024-01-23
Payer: COMMERCIAL

## 2024-01-23 DIAGNOSIS — F32.81 PREMENSTRUAL DYSPHORIC DISORDER: ICD-10-CM

## 2024-01-23 PROCEDURE — 99214 OFFICE O/P EST MOD 30 MIN: CPT | Mod: 95

## 2024-01-23 NOTE — HISTORY OF PRESENT ILLNESS
[FreeTextEntry1] : Patient states that she is doing very well since last visit.  Patient reported sustained improvement of depression and anxiety symptoms since last visit.  Patient reported sleep and appetite are good. Patient reported that with current medication combination is helping with depression symptoms and also premenstrual dysphoric symptoms. She reported energy, motivation and desire to do things is good and she denied feeling hopeless or helpless and denied passive or active SI/plan or intent.  She reported "I have not had seasonal depression" symptoms this winter and when she does feel little bit low she goes to the gym or does things that that is helping her cope well.  No new medical issues, no new medication since last visit  Patient states she usually does not drink alcohol and denied any drug use She reported adherence to med regimen. Menstrual periods: regular Patient states that that she did have a very good time on a [place] cruise took Dramamine with good effect.

## 2024-01-23 NOTE — REASON FOR VISIT
[Patient] : Patient [Patient preference] : as per patient preference [Telehealth (audio & video) - Individual/Group] : This visit was provided via telehealth using real-time 2-way audio visual technology. [Medical Office: ([facility])___] : The provider was located at the medical office in [unfilled]. [Home] : The patient, [unfilled], was located at home, [unfilled], at the time of the visit. [Verbal consent obtained from patient/other participant(s)] : Verbal consent for telehealth/telephonic services obtained from patient/other participant(s) [FreeTextEntry1] : depression and anxiety

## 2024-01-23 NOTE — PLAN
[No] : No [Medication education provided] : Medication education provided. [Rationale for medication choices, possible risks/precautions, benefits, alternative treatment choices, and consequences of non-treatment discussed] : Rationale for medication choices, possible risks/precautions, benefits, alternative treatment choices, and consequences of non-treatment discussed with patient/family/caregiver  [FreeTextEntry5] : Psychoeducation and supportive therapy provided,  and discussed rationale for recommended meds.  Education about behavior activation and using light therapy for seasonal depression symptoms. Medication: Continue bupropion  mg/day and continue Sertraline 50 mg/day  Educated patient of importance of remaining abstinent from drugs and alcohol.   Emergency procedures were discussed: pt. educated to call 911 or go to nearest ER for worsening of symptoms/suicidal/homicidal ideation.  RTC in 3 months or earlier as needed.  Patient given opportunity to ask questions and their questions were answered and they expressed understanding and agreement with above plan.

## 2024-01-23 NOTE — DISCUSSION/SUMMARY
[FreeTextEntry1] : The patient is a 35 yo woman with hx of sx consistent with major depressive disorder, and anxiety sx, on sertraline with good effect, came off meds during 3rd trimester, had   had postpartum depression, resumed sertraline, but only partially effective and also reports premenstrual exacerbation of mood sx.   4/6/2021: Patient reports mild discontinuation withdrawal sx with Zoloft taper and needed longer taper and also had mild GI sx during cross titration while on both Zoloft and Prozac, now no GI side effects on Prozac alone, and minimal change in sx, still feeling depressed.  4/28/2021: Patient reports continued GI side effects with mild nausea on Prozac and reports also feeling more irritable and angry and continues to feel depressed.  Patient also had only a partial benefit with Zoloft and could not tolerate 200 mg when dose was maximized for increased therapeutic benefit.  Considering patient had 2 SSRI trials we will switch her to an SNRI. 5/19/2021: Patient reports slight improvement of mood and reduced irritability, rates her symptoms of depression at 5 out of 10,10 being the worst, tolerating Effexor well, will continue with dose titration for increased therapeutic benefit. 6/30/2021: Patient reports significant improvement of symptoms of depression and premenstrual dysphoric symptoms and reports that she feels like she is back to her baseline, no side effects with medications at this time. 08/25/2021: Patient reports premenstrual exacerbation of mood symptoms which are lasting longer than a day after she begins her period, and may benefit from increasing Effexor XR dose for better control of sx in per and post menstrual period.  11/9/2021: Patient reports improved symptoms of depression after last dose increase with the Effexor but still continues to have some premenstrual dysphoric symptoms, would benefit from increasing the Effexor dose to maximize the therapeutic benefit. 1/25/2022:States she is feeling better, but still 2 days before her period she is feeling very irritable and emotional and a day after she gets her period she is back to feeling better and states rest of the month mood is significantly better and rates depression symptoms at intensity of 2/10, 10 being the worst.  3/1/2022: Patient states she felt Effexor helped for a few months, but for past 6 weeks or so she is not feeling good and that her sx of depression were gradually getting worse and she hoped coping skills and med changes made on last visit would help, but she is having adverse effects and depression sx are getting worse.  3/21/2022: Patient is of Effexor, and on bupropion for less than 2 weeks, and no side effects, reports minimal improvement of mood and anxiety sx, We agree to monitor sx for incremental improvement on current dose of bupropion.  4/12/2022: Patient reports improving symptoms of depression and anxiety after she started bupropion however she still continues to experience discontinuation withdrawal symptoms when she goes without venlafaxine for 48 hours.  5/10/2022: Patient reports she feels depression is much better on bupropion and also she is not having the side effects as she did with venlafaxine and still having some withdrawal symptoms and tapering off venlafaxine slowly.  She reports premenstrual dysphoric mood symptoms with the last menstrual cycle and wants to take SSRI as we had previously discussed.  Patient was on Zoloft had no side effects but it was not effective in treating depression symptoms anymore.  Discussed with the patient either a trial of sertraline or Lexapro for premenstrual mood symptoms.  Even though sertraline was not effective and treatment of MDD mechanism of action for PMDD is different and considering she had no side effects we could again try sertraline for PMDD, if not effective we will consider use another SSRI.  Considering she did not have any side effects with Zoloft she would like to try again Zoloft for premenstrual mood symptoms. 7/12/2022: Patient reports that she feels depression symptoms are in good control in the premenstrual anxiety and mood symptoms are better controlled with adding some trim in the motel fees but it appears that she continues to feel the symptoms until about 3 days into her menstrual cycle.  08/30/2022:Patient was advised to take sertraline for irritability she had experienced before menstrual period and she also was having this irritability continue for a few days after she got her period, and she had been taking sertraline daily with good effect on mood and remains stable with current meds, reported no depression and anxiety sx at this time.  11/29/2022: Patient reported sustained improvement of symptoms of depression, premenstrual mood symptoms and anxiety symptoms continue to remain in good control with the current medication regimen. 3/1/2023: Patient is continuing to do well on current medication regimen and also just reported that the light therapy has helped with the seasonal depression symptoms and feels that the current meds also help with the PMDD symptoms. 6/12/2023: Patient overall continuing to do well with good control of symptoms of depression and PMDD with current medication regimen.  Reported some stress related to father's health and felt that situational anxiety symptom intensity was slightly increased but overall feels she is coping with anxiety symptoms and agrees to continue to monitor for stability and if there is an increase in overall anxiety then we can consider med changes otherwise she will learn coping skills and breathing and relaxation techniques to help her manage the anxiety symptoms that she usually experiences in certain situations. 10/31/2023: Patient continuing to do well with good control of symptoms of depression, premenstrual mood symptoms 1/23/2024: Patient is continuing to do well with good control of her depression symptoms and PMDD symptoms and reported that this winter she is not experiencing seasonal depression symptoms and is relying on behavior activation going to gym etc. to mitigate when she has an occasional day that she feels low mood.

## 2024-04-24 ENCOUNTER — APPOINTMENT (OUTPATIENT)
Dept: PSYCHIATRY | Facility: CLINIC | Age: 39
End: 2024-04-24
Payer: COMMERCIAL

## 2024-04-24 DIAGNOSIS — F33.9 MAJOR DEPRESSIVE DISORDER, RECURRENT, UNSPECIFIED: ICD-10-CM

## 2024-04-24 PROCEDURE — 99214 OFFICE O/P EST MOD 30 MIN: CPT | Mod: 95

## 2024-04-24 PROCEDURE — G2211 COMPLEX E/M VISIT ADD ON: CPT | Mod: NC,1L

## 2024-04-24 NOTE — DISCUSSION/SUMMARY
[FreeTextEntry1] : The patient is a 37 yo woman with hx of sx consistent with major depressive disorder, and anxiety sx, on sertraline with good effect, came off meds during 3rd trimester, had   had postpartum depression, resumed sertraline, but only partially effective and also reports premenstrual exacerbation of mood sx.   4/6/2021: Patient reports mild discontinuation withdrawal sx with Zoloft taper and needed longer taper and also had mild GI sx during cross titration while on both Zoloft and Prozac, now no GI side effects on Prozac alone, and minimal change in sx, still feeling depressed.  4/28/2021: Patient reports continued GI side effects with mild nausea on Prozac and reports also feeling more irritable and angry and continues to feel depressed.  Patient also had only a partial benefit with Zoloft and could not tolerate 200 mg when dose was maximized for increased therapeutic benefit.  Considering patient had 2 SSRI trials we will switch her to an SNRI. 5/19/2021: Patient reports slight improvement of mood and reduced irritability, rates her symptoms of depression at 5 out of 10,10 being the worst, tolerating Effexor well, will continue with dose titration for increased therapeutic benefit. 6/30/2021: Patient reports significant improvement of symptoms of depression and premenstrual dysphoric symptoms and reports that she feels like she is back to her baseline, no side effects with medications at this time. 08/25/2021: Patient reports premenstrual exacerbation of mood symptoms which are lasting longer than a day after she begins her period, and may benefit from increasing Effexor XR dose for better control of sx in per and post menstrual period.  11/9/2021: Patient reports improved symptoms of depression after last dose increase with the Effexor but still continues to have some premenstrual dysphoric symptoms, would benefit from increasing the Effexor dose to maximize the therapeutic benefit. 1/25/2022:States she is feeling better, but still 2 days before her period she is feeling very irritable and emotional and a day after she gets her period she is back to feeling better and states rest of the month mood is significantly better and rates depression symptoms at intensity of 2/10, 10 being the worst.  3/1/2022: Patient states she felt Effexor helped for a few months, but for past 6 weeks or so she is not feeling good and that her sx of depression were gradually getting worse and she hoped coping skills and med changes made on last visit would help, but she is having adverse effects and depression sx are getting worse.  3/21/2022: Patient is of Effexor, and on bupropion for less than 2 weeks, and no side effects, reports minimal improvement of mood and anxiety sx, We agree to monitor sx for incremental improvement on current dose of bupropion.  4/12/2022: Patient reports improving symptoms of depression and anxiety after she started bupropion however she still continues to experience discontinuation withdrawal symptoms when she goes without venlafaxine for 48 hours.  5/10/2022: Patient reports she feels depression is much better on bupropion and also she is not having the side effects as she did with venlafaxine and still having some withdrawal symptoms and tapering off venlafaxine slowly.  She reports premenstrual dysphoric mood symptoms with the last menstrual cycle and wants to take SSRI as we had previously discussed.  Patient was on Zoloft had no side effects but it was not effective in treating depression symptoms anymore.  Discussed with the patient either a trial of sertraline or Lexapro for premenstrual mood symptoms.  Even though sertraline was not effective and treatment of MDD mechanism of action for PMDD is different and considering she had no side effects we could again try sertraline for PMDD, if not effective we will consider use another SSRI.  Considering she did not have any side effects with Zoloft she would like to try again Zoloft for premenstrual mood symptoms. 7/12/2022: Patient reports that she feels depression symptoms are in good control in the premenstrual anxiety and mood symptoms are better controlled with adding some trim in the motel fees but it appears that she continues to feel the symptoms until about 3 days into her menstrual cycle.  08/30/2022:Patient was advised to take sertraline for irritability she had experienced before menstrual period and she also was having this irritability continue for a few days after she got her period, and she had been taking sertraline daily with good effect on mood and remains stable with current meds, reported no depression and anxiety sx at this time.  11/29/2022: Patient reported sustained improvement of symptoms of depression, premenstrual mood symptoms and anxiety symptoms continue to remain in good control with the current medication regimen. 3/1/2023: Patient is continuing to do well on current medication regimen and also just reported that the light therapy has helped with the seasonal depression symptoms and feels that the current meds also help with the PMDD symptoms. 6/12/2023: Patient overall continuing to do well with good control of symptoms of depression and PMDD with current medication regimen.  Reported some stress related to father's health and felt that situational anxiety symptom intensity was slightly increased but overall feels she is coping with anxiety symptoms and agrees to continue to monitor for stability and if there is an increase in overall anxiety then we can consider med changes otherwise she will learn coping skills and breathing and relaxation techniques to help her manage the anxiety symptoms that she usually experiences in certain situations. 10/31/2023: Patient continuing to do well with good control of symptoms of depression, premenstrual mood symptoms 1/23/2024: Patient is continuing to do well with good control of her depression symptoms and PMDD symptoms and reported that this winter she is not experiencing seasonal depression symptoms and is relying on behavior activation going to gym etc. to mitigate when she has an occasional day that she feels low mood. 4/24/2024: The patient is doing well, remains stable with good control of depression, anxiety and PMDD symptoms.

## 2024-04-24 NOTE — REASON FOR VISIT
[Patient preference] : as per patient preference [Telehealth (audio & video) - Individual/Group] : This visit was provided via telehealth using real-time 2-way audio visual technology. [Medical Office: ([facility])___] : The provider was located at the medical office in [unfilled]. [Home] : The patient, [unfilled], was located at home, [unfilled], at the time of the visit. [Verbal consent obtained from patient/other participant(s)] : Verbal consent for telehealth/telephonic services obtained from patient/other participant(s) [Patient] : Patient [FreeTextEntry1] : depression and anxiety

## 2024-04-24 NOTE — HISTORY OF PRESENT ILLNESS
[FreeTextEntry1] : Patient states that she is continuing to do very well since last visit.  Patient reported sustained improvement of depression and anxiety symptoms since last visit.  Patient reported sleep and appetite are good. She reported energy, motivation and desire to do things is good and she denied feeling hopeless or helpless and denied passive or active SI/plan or intent.  Patient reported that she is able to recognize and manage mild mood changes she is having in the premenstrual phase. Patient states she is busy with things at home and work, functioning well. States she noticed it is "taking me a minute" to remember of things that she did last week, no wordfinding problems, not forgetting dates, appointments etc.   No new medical issues, no new medication since last visit  Patient states she usually does not drink alcohol and denied any drug use She reported adherence to med regimen. Menstrual periods: regular

## 2024-04-24 NOTE — PLAN
[No] : No [Medication education provided] : Medication education provided. [Rationale for medication choices, possible risks/precautions, benefits, alternative treatment choices, and consequences of non-treatment discussed] : Rationale for medication choices, possible risks/precautions, benefits, alternative treatment choices, and consequences of non-treatment discussed with patient/family/caregiver  [FreeTextEntry5] : Psychoeducation and supportive therapy provided and discussed rationale for recommended meds.  Medication: Continue bupropion  mg/day and continue Sertraline 50 mg/day  Educated patient of importance of remaining abstinent from drugs and alcohol.   Emergency procedures were discussed: pt. educated to call 911 or go to nearest ER for worsening of symptoms/suicidal/homicidal ideation.  RTC in 3 months or earlier as needed.  Patient given opportunity to ask questions and their questions were answered and they expressed understanding and agreement with above plan.

## 2024-05-01 RX ORDER — SERTRALINE HYDROCHLORIDE 50 MG/1
50 TABLET, FILM COATED ORAL
Qty: 90 | Refills: 0 | Status: ACTIVE | COMMUNITY
Start: 2022-05-10 | End: 1900-01-01

## 2024-05-01 RX ORDER — BUPROPION HYDROCHLORIDE 150 MG/1
150 TABLET, EXTENDED RELEASE ORAL
Qty: 90 | Refills: 0 | Status: ACTIVE | COMMUNITY
Start: 2022-03-01 | End: 1900-01-01

## 2024-06-28 NOTE — PATIENT PROFILE OB - PAIN SCALE PREFERRED, PROFILE
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 Lower back pain, not relieved by ibuprofen. Denies numbness and tingling.      Triage Assessment (Adult)       Row Name 06/28/24 0106          Triage Assessment    Airway WDL WDL        Respiratory WDL    Respiratory WDL WDL        Skin Circulation/Temperature WDL    Skin Circulation/Temperature WDL WDL        Cardiac WDL    Cardiac WDL WDL        Peripheral/Neurovascular WDL    Peripheral Neurovascular WDL WDL        Cognitive/Neuro/Behavioral WDL    Cognitive/Neuro/Behavioral WDL WDL                      numerical 0-10

## 2025-03-19 NOTE — PATIENT PROFILE OB - ALCOHOL USE HISTORY SINGLE SELECT
RUR: 8%    JESSICA opened case for assessment of D/C planning needs, CM reviewed chart.  CM completed assessment with pt via phone. Pt is alert and oriented throughout encounter. CM introduced self/role, verified demographics, and discussed discharge planning.    -Patient works FT. Patient will need a work note on discharge.   -No DME   -Patient spouse able to provide transportation on discharge.   -Patient in agreement for follow up with PCP and cardiology to be scheduled.   -Pharmacy is [facility].     JESSICA will send email to cardiology for assistance with follow up.     Mickie LOPEZ       03/19/25 4049   Service Assessment   Patient Orientation Alert and Oriented   Cognition Alert   History Provided By Patient   Primary Caregiver Self   Accompanied By/Relationship none   Support Systems Spouse/Significant Other   Patient's Healthcare Decision Maker is: Patient Declined (Legal Next of Kin Remains as Decision Maker)   PCP Verified by CM Yes   Last Visit to PCP Within last 3 months   Prior Functional Level Independent in ADLs/IADLs   Current Functional Level Independent in ADLs/IADLs   Can patient return to prior living arrangement Yes   Ability to make needs known: Good   Family able to assist with home care needs: Yes   Would you like for me to discuss the discharge plan with any other family members/significant others, and if so, who? No   Financial Resources None   Community Resources Health Education   CM/FANG Referral Difficulty coping/adjusting to illness;Disease Management Education   Social/Functional History   Lives With Spouse   Type of Home House   Home Layout One level   Home Equipment None   Receives Help From Family   Prior Level of Assist for ADLs Independent   Prior Level of Assist for Homemaking Independent   Homemaking Responsibilities No   Ambulation Assistance Independent   Prior Level of Assist for Transfers Independent   Occupation Full time employment   Type of Occupation [employer]    Discharge Planning   Type of Residence House   Living Arrangements Spouse/Significant Other        never

## 2025-07-27 ENCOUNTER — NON-APPOINTMENT (OUTPATIENT)
Age: 40
End: 2025-07-27